# Patient Record
Sex: MALE | Race: WHITE | NOT HISPANIC OR LATINO | Employment: OTHER | URBAN - METROPOLITAN AREA
[De-identification: names, ages, dates, MRNs, and addresses within clinical notes are randomized per-mention and may not be internally consistent; named-entity substitution may affect disease eponyms.]

---

## 2023-06-06 ENCOUNTER — TELEPHONE (OUTPATIENT)
Dept: UROLOGY | Facility: AMBULATORY SURGERY CENTER | Age: 73
End: 2023-06-06

## 2023-06-06 PROBLEM — N52.9 ERECTILE DYSFUNCTION: Status: ACTIVE | Noted: 2023-06-06

## 2023-06-06 PROBLEM — R39.15 URINARY URGENCY: Status: ACTIVE | Noted: 2023-06-06

## 2023-06-06 PROBLEM — R33.8 BENIGN PROSTATIC HYPERPLASIA WITH URINARY RETENTION: Status: ACTIVE | Noted: 2023-06-06

## 2023-06-06 PROBLEM — N20.0 RENAL CALCULI: Status: ACTIVE | Noted: 2023-06-06

## 2023-06-06 PROBLEM — T83.9XXA FOLEY CATHETER PROBLEM, INITIAL ENCOUNTER (HCC): Status: ACTIVE | Noted: 2023-06-06

## 2023-06-06 PROBLEM — N40.1 BENIGN PROSTATIC HYPERPLASIA WITH URINARY RETENTION: Status: ACTIVE | Noted: 2023-06-06

## 2023-06-06 PROBLEM — R30.0 DYSURIA: Status: ACTIVE | Noted: 2023-06-06

## 2023-06-06 RX ORDER — FUROSEMIDE 40 MG/1
40 TABLET ORAL DAILY
COMMUNITY
Start: 2023-06-02

## 2023-06-06 RX ORDER — ONDANSETRON 4 MG/1
4 TABLET, FILM COATED ORAL EVERY 8 HOURS PRN
COMMUNITY
Start: 2023-06-02

## 2023-06-06 RX ORDER — UMECLIDINIUM BROMIDE AND VILANTEROL TRIFENATATE 62.5; 25 UG/1; UG/1
POWDER RESPIRATORY (INHALATION)
COMMUNITY
Start: 2023-05-18

## 2023-06-06 RX ORDER — ALBUTEROL SULFATE 90 UG/1
AEROSOL, METERED RESPIRATORY (INHALATION)
COMMUNITY
Start: 2023-05-29

## 2023-06-06 RX ORDER — MOMETASONE FUROATE 1 MG/G
CREAM TOPICAL
COMMUNITY
Start: 2023-05-18

## 2023-06-06 RX ORDER — ASPIRIN 81 MG/1
81 TABLET, COATED ORAL DAILY
COMMUNITY
Start: 2023-06-02

## 2023-06-06 RX ORDER — BUDESONIDE 180 UG/1
AEROSOL, POWDER RESPIRATORY (INHALATION)
COMMUNITY
Start: 2023-04-03

## 2023-06-06 NOTE — TELEPHONE ENCOUNTER
New Patient    What is the reason for the patient’s appointment?: ED F/u-urinary retention pt currently has nash   Pt was recently put on water pill per her PCP     What office location does the patient prefer?: [place]    Does patient have Imaging/Lab Results: labs and US 6/5/23 at [facility]     Have patient records been requested?:  If No, are the records showing in Epic: records are requested through care everywhere      INSURANCE:  Do we accept the patient's insurance or is the patient Self-Pay?: yes     Insurance Provider: Medicare [address] [place] [address]:  Member ID#: [id number]  ID#- [id number]      HISTORY:   Has the patient had any previous Urologist(s)?: no    Was the patient seen in the ED?: 6/5/23 [facility]     Has the patient had any outside testing done?: [address] [facility] 6/5/23 [facility] ED     Does the patient have a personal history of cancer?: no     Pt call [phone] Khadra Martines (wife)

## 2023-06-06 NOTE — PROGRESS NOTES
UROLOGY PROGRESS NOTE         NAME: Jo Jackson  AGE: 71 y o  SEX: male  : 1950   MRN: [id number]    DATE: 2023  TIME: 9:10 AM    Assessment and Plan         Bladder instillation     Date/Time 2023 9:00 AM     Performed by  Satinder Ellis MD   Authorized by Satinder Ellis MD     Procedure Details   Procedure Notes: Patient catheter was flushed with 30 cc of normal saline irrigated a little bit of clot out and some mild hematuria otherwise irrigated fine  Patient tolerated well              Impression:   1  Zavala catheter problem, initial encounter ([facility] )    2  Benign prostatic hyperplasia with urinary retention    3  Renal calculi    4  Erectile dysfunction, unspecified erectile dysfunction type    5  Urinary urgency    6  Dysuria    7  Acute cystitis without hematuria         Plan: Plan is Cipro twice a day for 10 days  Start Flomax  Voiding trial next Wednesday  Heating pad over the penis and bladder area for bladder discomfort  Patient and wife agree with this plan  Continue as needed sildenafil and regarding the 8 mm right nonobstructing stone in the kidney recommend observation  Chief Complaint     Chief Complaint   Patient presents with   • Urinary Retention     History of Present Illness     HPI: Jo Jackson is a 71y o  year old male who presents with urinary retention from [address] [place] [address] yesterday in the ER  He was also found to have on ultrasound 8 mm nonobstructing right renal calculi  There was also noted to left lower pole renal calculi no hydronephrosis  Apparently the patient had a bladder scan in the ER for greater than 600 cc and was complaining prior to the ER visit of urge to void and only dribbling urine and it burned  Review of medication he also takes sildenafil for erectile dysfunction  That was from 2019      Other review of medications I do not see the patient on any prostate medications nor any previous  medical or surgical "history  Patient did have a PSA April 2022 that was 1 0    Urinalysis appearance was cloudy large amount of blood nitrite and leukocyte were negative  I do not see that he was placed on antibiotics    No previous  medical or surgical history  Patient states he has had the symptoms for 3 weeks including the burning urgency frequency decreased flow  Prior to that he was not having any urinary troubles  He did have relief of the lower abdominal pain when the catheter was placed which suggest this was acute retention  Unfortunately in the [place] ER they did not give him any Flomax nor any antibiotic  The following portions of the patient's history were reviewed and updated as appropriate: allergies, current medications, past family history, past medical history, past social history, past surgical history and problem list   Past Medical History:   Diagnosis Date   • [facility]      Past Surgical History:   Procedure Laterality Date   • NO PAST SURGERIES       shoulder  Review of Systems     Const: Denies chills, fever and weight loss  CV: Denies chest pain  Resp: Denies SOB  GI: Denies abdominal pain, nausea and vomiting  : Denies symptoms other than stated above  Musculo: Denies back pain  Objective   /70 (BP Location: Left arm, Patient Position: Sitting, Cuff Size: Adult)   Temp 97 7 °F (36 5 °C)   Ht 5' 9\" (1 753 m)   Wt 64 kg (141 lb 3 2 oz)   SpO2 97%   BMI 20 85 kg/m²     Physical Exam  Const: Appears healthy and well developed  No signs of acute distress present  Resp: Respirations are regular and unlabored  CV: Rate is regular  Rhythm is regular  Abdomen: Abdomen is soft, nontender, and nondistended  Kidneys are not palpable  : Normal external genitalia exam with a 16 [place] Zavala imaging from urethral meatus prostate was enlarged but smooth no masses or nodules  Psych: Patient's attitude is cooperative   Mood is normal  Affect is " normal     Current Medications     Current Outpatient Medications:   •  Anoro Ellipta 62 5-25 MCG/ACT inhaler, INHALE 1 PUFF INTO LUNGS EVERY DAY, Disp: , Rfl:   •  Aspirin Low Dose 81 MG EC tablet, Take 81 mg by mouth daily, Disp: , Rfl:   •  mometasone (ELOCON) 0 1 % cream, APPLY TOPICALLY AS NEEDED FOR ECZEMA, Disp: , Rfl:   •  ondansetron (ZOFRAN) 4 mg tablet, Take 4 mg by mouth every 8 (eight) hours as needed, Disp: , Rfl:   •  Pulmicort Flexhaler 180 MCG/ACT inhaler, INHALE 2 PUFFS INTO THE LUNGS TWICE A DAY, Disp: , Rfl:   •  Ventolin  (90 Base) MCG/ACT inhaler, INHALE 2 PUFFS BY MOUTH EVERY 4 HOURS AS NEEDED FOR WHEEZING, Disp: , Rfl:   •  furosemide (LASIX) 40 mg tablet, Take 40 mg by mouth daily (Patient not taking: Reported on 6/8/2023), Disp: , Rfl:         Jesus Carrillo MD

## 2023-06-08 ENCOUNTER — OFFICE VISIT (OUTPATIENT)
Dept: UROLOGY | Facility: CLINIC | Age: 73
End: 2023-06-08
Payer: MEDICARE

## 2023-06-08 VITALS
DIASTOLIC BLOOD PRESSURE: 70 MMHG | HEART RATE: 51 BPM | SYSTOLIC BLOOD PRESSURE: 100 MMHG | TEMPERATURE: 97.7 F | OXYGEN SATURATION: 97 % | HEIGHT: 69 IN | BODY MASS INDEX: 20.91 KG/M2 | WEIGHT: 141.2 LBS

## 2023-06-08 DIAGNOSIS — R30.0 DYSURIA: ICD-10-CM

## 2023-06-08 DIAGNOSIS — N20.0 RENAL CALCULI: ICD-10-CM

## 2023-06-08 DIAGNOSIS — R33.8 BENIGN PROSTATIC HYPERPLASIA WITH URINARY RETENTION: ICD-10-CM

## 2023-06-08 DIAGNOSIS — N52.9 ERECTILE DYSFUNCTION, UNSPECIFIED ERECTILE DYSFUNCTION TYPE: ICD-10-CM

## 2023-06-08 DIAGNOSIS — R39.15 URINARY URGENCY: ICD-10-CM

## 2023-06-08 DIAGNOSIS — N30.00 ACUTE CYSTITIS WITHOUT HEMATURIA: ICD-10-CM

## 2023-06-08 DIAGNOSIS — N40.1 BENIGN PROSTATIC HYPERPLASIA WITH URINARY RETENTION: ICD-10-CM

## 2023-06-08 DIAGNOSIS — T83.9XXA FOLEY CATHETER PROBLEM, INITIAL ENCOUNTER (HCC): Primary | ICD-10-CM

## 2023-06-08 PROCEDURE — 99204 OFFICE O/P NEW MOD 45 MIN: CPT | Performed by: UROLOGY

## 2023-06-08 RX ORDER — CIPROFLOXACIN 500 MG/1
500 TABLET, FILM COATED ORAL EVERY 12 HOURS SCHEDULED
Qty: 20 TABLET | Refills: 0 | Status: SHIPPED | OUTPATIENT
Start: 2023-06-08 | End: 2023-06-18

## 2023-06-08 RX ORDER — TAMSULOSIN HYDROCHLORIDE 0.4 MG/1
0.4 CAPSULE ORAL
Qty: 90 CAPSULE | Refills: 3 | Status: SHIPPED | OUTPATIENT
Start: 2023-06-08

## 2023-06-10 NOTE — PROGRESS NOTES
UROLOGY PROGRESS NOTE         NAME: Leno Seaman  AGE: 71 y o  SEX: male  : 1950   MRN: [id number]    DATE: 6/10/2023  TIME: 2:28 PM    Assessment and Plan         Bladder instillation     Date/Time 2023 11:15 AM     Performed by  Merna Solomon MD   Authorized by Merna Solomon MD     Procedure Details   Procedure Notes: Patient was prepped and draped in a sterile fashion 180 cc were placed into the bladder via the catheter and he was able to void it out to completion  Patient tolerated well              Impression:   1  Benign prostatic hyperplasia with urinary retention    2  Renal calculi    3  Acute cystitis without hematuria    4  Zavala catheter problem, initial encounter ([place] [address] )    5  Erectile dysfunction, unspecified erectile dysfunction type    6  Dysuria    7  Urinary urgency         Plan: We will continue Flomax, patient passed his voiding trial   We will place him on timed voiding double void every 2-3 hours while awake he has difficulty voiding soon we will see him back sooner  Otherwise I like to see him back in about 10 to 12 weeks for a postvoid residual, and set up PSA  Chief Complaint   No chief complaint on file  History of Present Illness     HPI: Leno Seaman is a 71y o  year old male who presents with follow-up office visit from me on 2023 for a voiding trial today  Patient started on Flomax by me as well as a 10-day course of Cipro for bacterial cystitis  Patient reports no problems with the catheter has been draining well almost completing his Cipro now he has been taking the Flomax      Note his PSA was 1 0 in 2022      The following portions of the patient's history were reviewed and updated as appropriate: allergies, current medications, past family history, past medical history, past social history, past surgical history and problem list   Past Medical History:   Diagnosis Date   • COPD (chronic obstructive pulmonary disease) ([facility] )      Past Surgical History:   Procedure Laterality Date   • NO PAST SURGERIES       shoulder  Review of Systems     Const: Denies chills, fever and weight loss  CV: Denies chest pain  Resp: Denies SOB  GI: Denies abdominal pain, nausea and vomiting  : Denies symptoms other than stated above  Musculo: Denies back pain  Objective   There were no vitals taken for this visit  Physical Exam  Const: Appears healthy and well developed  No signs of acute distress present  Resp: Respirations are regular and unlabored  CV: Rate is regular  Rhythm is regular  Abdomen: Abdomen is soft, nontender, and nondistended  Kidneys are not palpable  : nl  Psych: Patient's attitude is cooperative   Mood is normal  Affect is normal     Current Medications     Current Outpatient Medications:   •  Anoro Ellipta 62 5-25 MCG/ACT inhaler, INHALE 1 PUFF INTO LUNGS EVERY DAY, Disp: , Rfl:   •  Aspirin Low Dose 81 MG EC tablet, Take 81 mg by mouth daily, Disp: , Rfl:   •  ciprofloxacin (CIPRO) 500 mg tablet, Take 1 tablet (500 mg total) by mouth every 12 (twelve) hours for 10 days, Disp: 20 tablet, Rfl: 0  •  furosemide (LASIX) 40 mg tablet, Take 40 mg by mouth daily (Patient not taking: Reported on 6/8/2023), Disp: , Rfl:   •  mometasone (ELOCON) 0 1 % cream, APPLY TOPICALLY AS NEEDED FOR ECZEMA, Disp: , Rfl:   •  ondansetron (ZOFRAN) 4 mg tablet, Take 4 mg by mouth every 8 (eight) hours as needed, Disp: , Rfl:   •  Pulmicort Flexhaler 180 MCG/ACT inhaler, INHALE 2 PUFFS INTO THE LUNGS TWICE A DAY, Disp: , Rfl:   •  tamsulosin (FLOMAX) 0 4 mg, Take 1 capsule (0 4 mg total) by mouth daily with dinner, Disp: 90 capsule, Rfl: 3  •  Ventolin  (90 Base) MCG/ACT inhaler, INHALE 2 PUFFS BY MOUTH EVERY 4 HOURS AS NEEDED FOR WHEEZING, Disp: , Rfl:         Kalli Lentz MD

## 2023-06-14 ENCOUNTER — OFFICE VISIT (OUTPATIENT)
Dept: UROLOGY | Facility: CLINIC | Age: 73
End: 2023-06-14
Payer: MEDICARE

## 2023-06-14 VITALS
DIASTOLIC BLOOD PRESSURE: 56 MMHG | HEART RATE: 69 BPM | HEIGHT: 68 IN | OXYGEN SATURATION: 98 % | TEMPERATURE: 97.5 F | SYSTOLIC BLOOD PRESSURE: 96 MMHG | BODY MASS INDEX: 21.43 KG/M2 | WEIGHT: 141.4 LBS

## 2023-06-14 DIAGNOSIS — N40.1 BENIGN PROSTATIC HYPERPLASIA WITH URINARY RETENTION: Primary | ICD-10-CM

## 2023-06-14 DIAGNOSIS — T83.9XXA FOLEY CATHETER PROBLEM, INITIAL ENCOUNTER (HCC): ICD-10-CM

## 2023-06-14 DIAGNOSIS — R30.0 DYSURIA: ICD-10-CM

## 2023-06-14 DIAGNOSIS — N20.0 RENAL CALCULI: ICD-10-CM

## 2023-06-14 DIAGNOSIS — R39.15 URINARY URGENCY: ICD-10-CM

## 2023-06-14 DIAGNOSIS — N52.9 ERECTILE DYSFUNCTION, UNSPECIFIED ERECTILE DYSFUNCTION TYPE: ICD-10-CM

## 2023-06-14 DIAGNOSIS — N30.00 ACUTE CYSTITIS WITHOUT HEMATURIA: ICD-10-CM

## 2023-06-14 DIAGNOSIS — R33.8 BENIGN PROSTATIC HYPERPLASIA WITH URINARY RETENTION: Primary | ICD-10-CM

## 2023-06-14 PROCEDURE — 99214 OFFICE O/P EST MOD 30 MIN: CPT | Performed by: UROLOGY
